# Patient Record
Sex: MALE | Race: WHITE
[De-identification: names, ages, dates, MRNs, and addresses within clinical notes are randomized per-mention and may not be internally consistent; named-entity substitution may affect disease eponyms.]

---

## 2018-03-03 ENCOUNTER — HOSPITAL ENCOUNTER (EMERGENCY)
Dept: HOSPITAL 56 - MW.ED | Age: 53
Discharge: LEFT BEFORE DISCHARGE | End: 2018-03-03
Payer: COMMERCIAL

## 2018-03-03 DIAGNOSIS — J02.9: Primary | ICD-10-CM

## 2018-03-03 DIAGNOSIS — F17.210: ICD-10-CM

## 2018-03-03 NOTE — EDM.PDOC
ED HPI GENERAL MEDICAL PROBLEM





- General


Chief Complaint: ENT Problem


Stated Complaint: COUGH,SORE THROAT


Time Seen by Provider: 03/03/18 13:58





- History of Present Illness


INITIAL COMMENTS - FREE TEXT/NARRATIVE: 





HISTORY AND PHYSICAL:





History of present illness:


The patient is a 52-year-old male with no stated pulmonary issues and travels 

here from [place] for work and presents with cough occasionally productive of 

some clear phlegm sore throat and chills body aches runny nose that started 

yesterday. The patient is concerned because he is here only to work and at home 

he has a mother with lung cancer that he is concerned about exposing himself 

to. He has not had vomiting or abdominal pain no diarrhea is not documented 

temperature. Patient has not tried any over-the-counter medications prior to 

coming here. The patient does not smoke and he says he's exposed to a lot of 

ill people at work. He does not have any chest pain or shortness of breath. 

Patient did not get his influenza vaccine this year





Review of systems: 


As per history of present illness and below otherwise all systems reviewed and 

negative.





Past medical history: 


As per history of present illness and as reviewed below otherwise 

noncontributory.





Surgical history: 


As per history of present illness and as reviewed below otherwise 

noncontributory.





Social history: 


No reported history of drug or alcohol abuse.





Family history: 


As per history of present illness and as reviewed below otherwise 

noncontributory.





Physical exam:


Gen.: Well-developed well-nourished man who is nontoxic and speaking clearly in 

the ED. Vital signs have been noted by me


HEENT: Atraumatic, normocephalic, pupils reactive, negative for conjunctival 

pallor or scleral icterus, mucous membranes moist, throat clear of exudates but 

there is erythema of the posterior oropharynx, there is no nuchal rigidity or 

cervical adenopathy,, neck supple, nontender, trachea midline.


Lungs: Clear to auscultation, breath sounds equal bilaterally, chest nontender. 

No worker breathing or wheezing/stridor


Heart: S1S2, regular rate and rhythm no overt murmurs


Abdomen: Soft, nondistended, nontender. NABS


Pelvis: Deferred


Genitourinary: Deferred.


Rectal: Deferred.


Extremities: Atraumatic, full range of motion grossly. Neurovascular 

unremarkable.


Neuro: Awake, alert, oriented. Cranial nerves II through XII unremarkable. 

Cerebellum unremarkable. Motor and sensory unremarkable throughout. Exam 

nonfocal.





Diagnostics:


Rapid strep influenza were ordered but were not performed please see below note








Therapeutics:





Prior to the nurse going in to perform the rapid strep influenza testing in 

other nurse was walking by and the patient said that he was going to leave 

because he would treat the problem himself and that he would follow-up with his 

provider in [place]. I was unable to have a dialogue with him and he eloped from 

the ER.








Impression: 


URI symptoms, eloped from the ER





Definitive disposition and diagnosis as appropriate pending reevaluation and 

review of above.


  ** Throat


Pain Score (Numeric/FACES): 4





- Related Data


 Allergies











Allergy/AdvReac Type Severity Reaction Status Date / Time


 


No Known Allergies Allergy   Verified 03/03/18 13:49











Home Meds: 


 Home Meds





. [No Known Home Meds]  03/03/18 [History]











Past Medical History





- Infectious Disease History


Infectious Disease History: Reports: Mumps





- Past Surgical History


GI Surgical History: Reports: Hernia Repair/Other


Other Musculoskeletal Surgeries/Procedures:: l wrist fx with repair





Social & Family History





- Family History


Family Medical History: Noncontributory





- Tobacco Use


Smoking Status *Q: Current Every Day Smoker


Years of Tobacco use: 30


Packs/Tins Daily: 1





- Caffeine Use


Caffeine Use: Reports: Coffee





- Recreational Drug Use


Recreational Drug Use: No





ED ROS GENERAL





- Review of Systems


Review Of Systems: ROS reveals no pertinent complaints other than HPI.





ED EXAM, GENERAL





- Physical Exam


Exam: See Below (See dictation)





Course





- Vital Signs


Last Recorded V/S: 


 Last Vital Signs











Temp  37.1 C   03/03/18 13:31


 


Pulse  95   03/03/18 13:31


 


Resp  18   03/03/18 13:31


 


BP  134/78   03/03/18 13:31


 


Pulse Ox  96   03/03/18 13:31














- Orders/Labs/Meds


Orders: 


 Active Orders 24 hr











 Category Date Time Status


 


 INFLUENZA A+B AG SCREEN [RM] Stat Lab  03/03/18 14:06 Ordered


 


 STREP SCRN A RAPID W CULT CONF [RM] Stat Lab  03/03/18 14:06 Ordered














Departure





- Departure


Time of Disposition: 14:22


Disposition: Eloped 07


Condition: Good


Clinical Impression: 


URI (upper respiratory infection)


Qualifiers:


 URI type: unspecified viral URI Qualified Code(s): J06.9 - Acute upper 

respiratory infection, unspecified





Pharyngitis


Qualifiers:


 Pharyngitis/tonsillitis etiology: unspecified etiology Qualified Code(s): 

J02.9 - Acute pharyngitis, unspecified








- Discharge Information


Referrals: 


PCP,None [Primary Care Provider] - 


Forms:  ED Department Discharge





- My Orders


Last 24 Hours: 


My Active Orders





03/03/18 14:06


INFLUENZA A+B AG SCREEN [RM] Stat 


STREP SCRN A RAPID W CULT CONF [RM] Stat 














- Assessment/Plan


Last 24 Hours: 


My Active Orders





03/03/18 14:06


INFLUENZA A+B AG SCREEN [RM] Stat 


STREP SCRN A RAPID W CULT CONF [RM] Stat